# Patient Record
(demographics unavailable — no encounter records)

---

## 2024-12-26 NOTE — HISTORY OF PRESENT ILLNESS
[FreeTextEntry1] : JORDANA [de-identified] : Seh feels otherwise well and offers no other acute concerns at this time.

## 2024-12-26 NOTE — HISTORY OF PRESENT ILLNESS
[FreeTextEntry1] : JORDANA [de-identified] : Seh feels otherwise well and offers no other acute concerns at this time.

## 2024-12-26 NOTE — ASSESSMENT
[FreeTextEntry1] : , ,  Preventative: Counseled on health promotion and disease prevention. EKG and wellness labs done Follow-up with OBGYN for Annual Well woman exam / Pap Heart Screen:  EKG nsr  PCOS: follow-up with GYN   HLD: Counseled on diet, exercise and lifestyle modifications. Advised a diet centered around whole plant based foods.  Vegetables, fruits, legumes, grains, nuts.  Advised limiting intake of refined processed foods (refined white flour products, refined sugar, soda, juice).  Limit intake of meat, dairy, eggs, oil.

## 2025-04-10 NOTE — HISTORY OF PRESENT ILLNESS
[FreeTextEntry1] : right ear pressure  [de-identified] : right otitis media augmentin , Zyrtec and  Flonase.

## 2025-04-23 NOTE — ASSESSMENT
[FreeTextEntry1] : ETD.  If no improvement will check MRI/A. Start Flonase and Nasal saline rinses. Check Audiogram. Seek attention for otalgia, otorrhea, bleeding, headache, hearing loss, dizziness or vertigo. Followup 2 months

## 2025-04-23 NOTE — REASON FOR VISIT
[Initial Evaluation] : an initial evaluation for [FreeTextEntry2] : right ear pressure and pulsatile tinnitus

## 2025-04-23 NOTE — CONSULT LETTER
[Dear  ___] : Dear  [unfilled], [Courtesy Letter:] : I had the pleasure of seeing your patient, [unfilled], in my office today. [Please see my note below.] : Please see my note below. [Sincerely,] : Sincerely, [FreeTextEntry2] : Dr. Michael Nunes [FreeTextEntry3] : Ming Olivera MD Otolaryngology at [address], [address] [place] [address] Phone: [phone] Fax: [phone]

## 2025-04-23 NOTE — PHYSICAL EXAM
[de-identified] : mild congestion [Midline] : trachea located in midline position [de-identified] : pharyngeal mucosal cobblestoning present [Normal] : no neck adenopathy

## 2025-04-23 NOTE — HISTORY OF PRESENT ILLNESS
[de-identified] : 33 year old female here for right ear pressure and infrequent pulsatile tinnitus starting 4/9/2025. Patient denies otalgia, otorrhea, ear infections, hearing loss, dizziness, vertigo.